# Patient Record
Sex: FEMALE | Race: WHITE | Employment: FULL TIME | URBAN - METROPOLITAN AREA
[De-identification: names, ages, dates, MRNs, and addresses within clinical notes are randomized per-mention and may not be internally consistent; named-entity substitution may affect disease eponyms.]

---

## 2017-10-02 RX ORDER — FOLIC ACID 1 MG/1
TABLET ORAL
Qty: 30 TABLET | Refills: 11 | Status: SHIPPED | OUTPATIENT
Start: 2017-10-02 | End: 2018-01-02

## 2017-10-02 NOTE — TELEPHONE ENCOUNTER
Pending Prescriptions Disp Refills    FOLIC ACID 1 MG Oral Tab [Pharmacy Med Name: FOLIC ACID 1MG TABLETS] 30 tablet 0     Sig: TAKE 1 TABLET(1 MG) BY MOUTH DAILY         See refill request  Patient last seen 11-18-16.    Medication last refilled 10-17-12

## 2017-12-11 ENCOUNTER — TELEPHONE (OUTPATIENT)
Dept: DERMATOLOGY CLINIC | Facility: CLINIC | Age: 42
End: 2017-12-11

## 2017-12-11 NOTE — TELEPHONE ENCOUNTER
Having a horrible break out, she is red and irritated all over the face and would not like to wait till 2nd week in January and I offered her appts in Dec. In [place] but did not work with her schedule she is not sure what to do until January to get relief

## 2017-12-11 NOTE — TELEPHONE ENCOUNTER
LOV 1/27/16 - pt with hx of acne - states she is having a bad break out to chin area only - states chin is red and painful. She has resumed her doxycycline beginning of November but stopped after 2 weeks and started OTC differin.  She resumed doxycycline sh

## 2017-12-12 ENCOUNTER — TELEPHONE (OUTPATIENT)
Dept: GASTROENTEROLOGY | Facility: CLINIC | Age: 42
End: 2017-12-12

## 2017-12-12 NOTE — TELEPHONE ENCOUNTER
Pt unable to come to tomorrow's appt per RN's request.  Requesting to see CB on Monday. 12/18/2017. Pls call. Thank you.

## 2017-12-12 NOTE — TELEPHONE ENCOUNTER
I offered the pt an appt on 12/19/17 [facility] BIPIN @ 1:30 . She will call me back tomorrow. I put a hold on the appt.     Phone Room it's ok to schedule if pt calls back

## 2017-12-12 NOTE — TELEPHONE ENCOUNTER
Problem and Assessment:    Pt spoken with at time of original call. Pt c/o pain, dry, cracking, bleeding skin to chin. 7/10 pain. Notes that it is painful to even open her mouth to chew food.   Pt denies edema of the face, denies respiratory distress and

## 2017-12-14 NOTE — TELEPHONE ENCOUNTER
Future Appointments  Date Time Provider Joaquin Valladares   12/20/2017 2:00 PM Omar Huff MD [facility] Artemio HARLEY   12/20/2017 5:30 PM MD INEZ Patel     Pt took appt for 12/20/17 at 2 pm at [facility]

## 2017-12-20 ENCOUNTER — OFFICE VISIT (OUTPATIENT)
Dept: GASTROENTEROLOGY | Facility: CLINIC | Age: 42
End: 2017-12-20

## 2017-12-20 ENCOUNTER — OFFICE VISIT (OUTPATIENT)
Dept: DERMATOLOGY CLINIC | Facility: CLINIC | Age: 42
End: 2017-12-20

## 2017-12-20 VITALS
HEIGHT: 63.5 IN | WEIGHT: 134 LBS | SYSTOLIC BLOOD PRESSURE: 118 MMHG | BODY MASS INDEX: 23.45 KG/M2 | DIASTOLIC BLOOD PRESSURE: 76 MMHG | HEART RATE: 101 BPM

## 2017-12-20 DIAGNOSIS — L73.9 ACNE WITH GRAM NEGATIVE FOLLICULITIS: Primary | ICD-10-CM

## 2017-12-20 DIAGNOSIS — B96.89 ACNE WITH GRAM NEGATIVE FOLLICULITIS: Primary | ICD-10-CM

## 2017-12-20 DIAGNOSIS — L70.0 ACNE VULGARIS: ICD-10-CM

## 2017-12-20 DIAGNOSIS — K51.00 ULCERATIVE PANCOLITIS WITHOUT COMPLICATION (HCC): Primary | ICD-10-CM

## 2017-12-20 DIAGNOSIS — L70.9 ACNE WITH GRAM NEGATIVE FOLLICULITIS: Primary | ICD-10-CM

## 2017-12-20 PROCEDURE — 99214 OFFICE O/P EST MOD 30 MIN: CPT | Performed by: INTERNAL MEDICINE

## 2017-12-20 PROCEDURE — 99213 OFFICE O/P EST LOW 20 MIN: CPT | Performed by: DERMATOLOGY

## 2017-12-20 PROCEDURE — 99212 OFFICE O/P EST SF 10 MIN: CPT | Performed by: INTERNAL MEDICINE

## 2017-12-20 PROCEDURE — 99212 OFFICE O/P EST SF 10 MIN: CPT | Performed by: DERMATOLOGY

## 2017-12-20 RX ORDER — SULFAMETHOXAZOLE AND TRIMETHOPRIM 400; 80 MG/1; MG/1
1 TABLET ORAL 2 TIMES DAILY
Qty: 30 TABLET | Refills: 1 | Status: SHIPPED | OUTPATIENT
Start: 2017-12-20 | End: 2018-01-17

## 2017-12-20 RX ORDER — MESALAMINE 1.2 G/1
4.8 TABLET, DELAYED RELEASE ORAL DAILY
Qty: 120 TABLET | Refills: 11 | Status: SHIPPED | OUTPATIENT
Start: 2017-12-20 | End: 2019-05-31 | Stop reason: ALTCHOICE

## 2017-12-20 NOTE — PROGRESS NOTES
HPI:    Patient ID: Hao Reyes returns today for follow-up. As last year, she is doing great. Continues in symptomatic remission. Ramos Bhandari continues to pass 1–2 bowel movements per day. No blood. No flares in the past year.     Continues on the s BIRTH:                                        08/19/1975 30Years old  DATE:                                                            10/17/2012 2:34 PM  MEDICAL RECORD KXBHYV:               PMZ:[id number]  [address]    VISIT XWHJ:                    History  Condition                                                         Year  Procedure/Surgery                                        Year  endometriosis                                                                                                    %              45        apply by topical route  every day to the affected area(s)at bedtime  07/25/2012 Aldactone                     25 Mg               30        take 1 tablet (25MG)  by oral route  every day  03/20/2012 Zafar                            Pancolitis on colonoscopy examination of October 2008, which has not yet been repeated. Overall, she has had good control with the mesalamine medications, changed to sulfasalazine medication July 2011 as above.  She has responded well to brief bursts of pre been repeated. Overall, she has had good control with the mesalamine medications, changed to sulfasalazine medication July 2011 as above. She has responded well to brief bursts of prednisone with her flares.     Returns for follow-up 12/20/2017 and symptoma

## 2017-12-21 ENCOUNTER — TELEPHONE (OUTPATIENT)
Dept: GASTROENTEROLOGY | Facility: CLINIC | Age: 42
End: 2017-12-21

## 2017-12-21 DIAGNOSIS — K51.919 ULCERATIVE COLITIS WITH COMPLICATION, UNSPECIFIED LOCATION (HCC): Primary | ICD-10-CM

## 2017-12-21 NOTE — TELEPHONE ENCOUNTER
Scheduled for:  Colonoscopy - [id number]  Provider Name:  Dr. John Jose  Date:  12/23/17  Location:  [facility]  Sedation:  IV  Time:  8:15 am (pt is aware to arrive at 7:15 am)  Prep:  Miralax/Gatorade, Prep instructions were given to pt over the phone, pt verbalized un

## 2017-12-21 NOTE — TELEPHONE ENCOUNTER
Pt states that Dr. Jessica Ortiz told her to call this office today to schedule colonoscopy for Sat 12/23/17. Please call.

## 2017-12-21 NOTE — TELEPHONE ENCOUNTER
Thanks Debbie!!    Schedule colonoscopy exam at [address] [place] [address] (Sat 12/23)    IV sedation (conscious sedation)    Miralax split dose bowel prep      DX = Ulcerative colitis

## 2017-12-21 NOTE — TELEPHONE ENCOUNTER
NINA RN's - please send Miralax & Gatorade prep instructions to pt via [employer]. Pt has CLN scheduled for this Saturday, 12/23/17 per Dr. Yari Patel. Thank you.

## 2017-12-21 NOTE — TELEPHONE ENCOUNTER
Dr Dirk Elmore - I see your notes below but do not see any orders on prep. Please advise        \"3. I recommended colonoscopy examination; should be undergoing colonoscopy exams every 3–5 years.   They have a high deductible insurance policy and just hit their

## 2017-12-22 NOTE — PROGRESS NOTES
Results not released. Not all final at this time, negative to date, please let pt know.   I do not know if she really would see improvement yet, may take more like 10-14 days

## 2017-12-23 ENCOUNTER — HOSPITAL ENCOUNTER (OUTPATIENT)
Facility: HOSPITAL | Age: 42
Setting detail: HOSPITAL OUTPATIENT SURGERY
Discharge: HOME | End: 2017-12-23
Attending: INTERNAL MEDICINE | Admitting: INTERNAL MEDICINE
Payer: COMMERCIAL

## 2017-12-23 ENCOUNTER — SURGERY (OUTPATIENT)
Age: 42
End: 2017-12-23

## 2017-12-23 VITALS
DIASTOLIC BLOOD PRESSURE: 76 MMHG | OXYGEN SATURATION: 99 % | HEART RATE: 98 BPM | WEIGHT: 134 LBS | RESPIRATION RATE: 25 BRPM | HEIGHT: 63 IN | SYSTOLIC BLOOD PRESSURE: 118 MMHG | BODY MASS INDEX: 23.74 KG/M2

## 2017-12-23 DIAGNOSIS — K51.919 ULCERATIVE COLITIS WITH COMPLICATION, UNSPECIFIED LOCATION (HCC): ICD-10-CM

## 2017-12-23 PROCEDURE — 0DBL8ZX EXCISION OF TRANSVERSE COLON, VIA NATURAL OR ARTIFICIAL OPENING ENDOSCOPIC, DIAGNOSTIC: ICD-10-PCS | Performed by: INTERNAL MEDICINE

## 2017-12-23 PROCEDURE — 0DBN8ZX EXCISION OF SIGMOID COLON, VIA NATURAL OR ARTIFICIAL OPENING ENDOSCOPIC, DIAGNOSTIC: ICD-10-PCS | Performed by: INTERNAL MEDICINE

## 2017-12-23 PROCEDURE — 0DBM8ZX EXCISION OF DESCENDING COLON, VIA NATURAL OR ARTIFICIAL OPENING ENDOSCOPIC, DIAGNOSTIC: ICD-10-PCS | Performed by: INTERNAL MEDICINE

## 2017-12-23 PROCEDURE — 45380 COLONOSCOPY AND BIOPSY: CPT | Performed by: INTERNAL MEDICINE

## 2017-12-23 PROCEDURE — 45385 COLONOSCOPY W/LESION REMOVAL: CPT | Performed by: INTERNAL MEDICINE

## 2017-12-23 PROCEDURE — 0DBK8ZX EXCISION OF ASCENDING COLON, VIA NATURAL OR ARTIFICIAL OPENING ENDOSCOPIC, DIAGNOSTIC: ICD-10-PCS | Performed by: INTERNAL MEDICINE

## 2017-12-23 RX ORDER — SODIUM CHLORIDE 0.9 % (FLUSH) 0.9 %
10 SYRINGE (ML) INJECTION AS NEEDED
Status: DISCONTINUED | OUTPATIENT
Start: 2017-12-23 | End: 2017-12-23

## 2017-12-23 RX ORDER — MIDAZOLAM HYDROCHLORIDE 1 MG/ML
INJECTION INTRAMUSCULAR; INTRAVENOUS
Status: DISCONTINUED | OUTPATIENT
Start: 2017-12-23 | End: 2017-12-23

## 2017-12-23 RX ORDER — MIDAZOLAM HYDROCHLORIDE 1 MG/ML
1 INJECTION INTRAMUSCULAR; INTRAVENOUS EVERY 5 MIN PRN
Status: DISCONTINUED | OUTPATIENT
Start: 2017-12-23 | End: 2017-12-23

## 2017-12-23 RX ORDER — SODIUM CHLORIDE, SODIUM LACTATE, POTASSIUM CHLORIDE, CALCIUM CHLORIDE 600; 310; 30; 20 MG/100ML; MG/100ML; MG/100ML; MG/100ML
INJECTION, SOLUTION INTRAVENOUS CONTINUOUS
Status: DISCONTINUED | OUTPATIENT
Start: 2017-12-23 | End: 2017-12-23

## 2017-12-23 NOTE — OPERATIVE REPORT
COLONOSCOPY PROCEDURE REPORT     DATE OF PROCEDURE:  12/23/2017     PCP: No primary care provider on file. PREOPERATIVE DIAGNOSIS: Ulcerative colitis with rectal bleeding     POSTOPERATIVE DIAGNOSIS:  See impression. SURGEON:  Dashawn Chris ileocecal valve with somewhat granular appearing mucosa on the valve itself, mucosal congestion and erythema, mucoid exudates. Intubation of the terminal ileum showed entirely normal ileal mucosa within this somewhat inflamed valve.   · Random colonic muco

## 2017-12-23 NOTE — H&P
History & Physical Examination    Patient Name: Carlota Spaulding  MRN: [id number]  [facility]: [id number]  YOB: 1975    Diagnosis: Ulcerative colitis    Present Illness: History of ulcerative colitis greater than 10 years, with rectal bleeding      Pr Relation Age of Onset   • Anemia Mother    • Other [OTHER] Father      Hearing loss   • Other [OTHER] Maternal Grandfather      heart attack        Smoking status: Former Smoker  0.25 Packs/day     Smokeless tobacco: Never Used    Alcohol use Yes  0.0 oz/w

## 2017-12-27 ENCOUNTER — TELEPHONE (OUTPATIENT)
Dept: GASTROENTEROLOGY | Facility: CLINIC | Age: 42
End: 2017-12-27

## 2017-12-27 NOTE — TELEPHONE ENCOUNTER
Chills and body aches, headache - since this afternoon - pt had CLN on 12/23- asking what meds she can take now for sx

## 2017-12-27 NOTE — TELEPHONE ENCOUNTER
I spoke to the pt. She is not running a fever. She called us because she was told  to stay away from ASA products up to 10 days after the procedure(colonoscopy 12/23/17). I advised she can take Tylenol which should cover most of their symptoms.

## 2017-12-28 NOTE — TELEPHONE ENCOUNTER
Thanks Anitra Markham! Agree with advice given. We cut out 2 large colon polyps and took a lot of biopsies so hopefully she can avoid anything stronger than Tylenol.

## 2017-12-30 ENCOUNTER — NURSE TRIAGE (OUTPATIENT)
Dept: OTHER | Age: 42
End: 2017-12-30

## 2017-12-30 NOTE — TELEPHONE ENCOUNTER
for  is it ok for pt to wait until Tuesday? . Pt stated that she has found some lumps on her neck and behind her ear. She stated that on Thursday she felt 1 of them and yesterday night she found 2 more.  One of the lumps is  behind her neck

## 2018-01-01 NOTE — PROGRESS NOTES
Sadia Bernardo is a 38year old female. Patient presents with:  Derm Problem: rash around mouth area ,electrical pain ,denies itch ,or bleed x 6 weeks got much worse when differin gel applied . Patient has no known allergies.     Current Outpa 2 (two) times daily. Disp: 30 tablet Rfl: 1   mupirocin 2 % External Ointment Apply 1 Application topically 3 (three) times daily.  Disp: 15 g Rfl: 3   FOLIC ACID 1 MG Oral Tab TAKE 1 TABLET(1 MG) BY MOUTH DAILY Disp: 30 tablet Rfl: 11   Doxycycline Hyclate ,denies itch ,or bleed x 6 weeks got much worse when differin gel applied . Has used various over-the-counter products without much change. Consider with increasing cystic nodules. In particular periorally.   Had been on doxycycline daily not taking twic months. Given history of ulcerative colitis would consider Accutane cautiously. Patient concerned regarding side effects and her UC. Luisora Dunker Acne inflammatory, nodular grade 3 with prominent postinflammatory changes. See medications in grid.   Skin care regime

## 2018-01-02 ENCOUNTER — OFFICE VISIT (OUTPATIENT)
Dept: FAMILY MEDICINE CLINIC | Facility: CLINIC | Age: 43
End: 2018-01-02

## 2018-01-02 ENCOUNTER — LAB ENCOUNTER (OUTPATIENT)
Dept: LAB | Age: 43
End: 2018-01-02
Attending: FAMILY MEDICINE
Payer: COMMERCIAL

## 2018-01-02 VITALS
HEIGHT: 63 IN | SYSTOLIC BLOOD PRESSURE: 102 MMHG | WEIGHT: 134 LBS | TEMPERATURE: 99 F | DIASTOLIC BLOOD PRESSURE: 67 MMHG | HEART RATE: 94 BPM | BODY MASS INDEX: 23.74 KG/M2

## 2018-01-02 DIAGNOSIS — R59.0 POSTAURICULAR ADENOPATHY: ICD-10-CM

## 2018-01-02 DIAGNOSIS — R59.0 CERVICAL ADENOPATHY: ICD-10-CM

## 2018-01-02 DIAGNOSIS — R22.1 NECK MASS: Primary | ICD-10-CM

## 2018-01-02 DIAGNOSIS — N76.0 ACUTE VAGINITIS: ICD-10-CM

## 2018-01-02 LAB
BASOPHILS # BLD: 0 K/UL (ref 0–0.2)
BASOPHILS NFR BLD: 0 %
EOSINOPHIL # BLD: 0 K/UL (ref 0–0.7)
EOSINOPHIL NFR BLD: 1 %
ERYTHROCYTE [DISTWIDTH] IN BLOOD BY AUTOMATED COUNT: 12.7 % (ref 11–15)
HCT VFR BLD AUTO: 37.9 % (ref 35–48)
HGB BLD-MCNC: 12.4 G/DL (ref 12–16)
LYMPHOCYTES # BLD: 1.5 K/UL (ref 1–4)
LYMPHOCYTES NFR BLD: 32 %
MCH RBC QN AUTO: 30 PG (ref 27–32)
MCHC RBC AUTO-ENTMCNC: 32.8 G/DL (ref 32–37)
MCV RBC AUTO: 91.5 FL (ref 80–100)
MONOCYTES # BLD: 0.4 K/UL (ref 0–1)
MONOCYTES NFR BLD: 8 %
NEUTROPHILS # BLD AUTO: 2.7 K/UL (ref 1.8–7.7)
NEUTROPHILS NFR BLD: 59 %
PLATELET # BLD AUTO: 228 K/UL (ref 140–400)
PMV BLD AUTO: 8.7 FL (ref 7.4–10.3)
RBC # BLD AUTO: 4.14 M/UL (ref 3.7–5.4)
WBC # BLD AUTO: 4.6 K/UL (ref 4–11)

## 2018-01-02 PROCEDURE — 86308 HETEROPHILE ANTIBODY SCREEN: CPT

## 2018-01-02 PROCEDURE — 99214 OFFICE O/P EST MOD 30 MIN: CPT | Performed by: FAMILY MEDICINE

## 2018-01-02 PROCEDURE — 85025 COMPLETE CBC W/AUTO DIFF WBC: CPT

## 2018-01-02 PROCEDURE — 36415 COLL VENOUS BLD VENIPUNCTURE: CPT

## 2018-01-02 PROCEDURE — 99212 OFFICE O/P EST SF 10 MIN: CPT | Performed by: FAMILY MEDICINE

## 2018-01-02 RX ORDER — FLUCONAZOLE 150 MG/1
150 TABLET ORAL ONCE
Qty: 1 TABLET | Refills: 0 | Status: SHIPPED | OUTPATIENT
Start: 2018-01-02 | End: 2018-01-02

## 2018-01-02 NOTE — PROGRESS NOTES
HPI:    Patient ID: Ricky Davis is a 38year old female. HPI     PATIENT here with complains of noticing lumps in her neck. She states she had a colonoscopy in December 23 and then a few days later felt very achy and tired.   She had low-grade fever Right Ear: External ear normal.   Left Ear: External ear normal.   Nose: Nose normal.   Mouth/Throat: Oropharynx is clear and moist. No oropharyngeal exudate. Neck: Normal range of motion. Neck supple. No thyromegaly present.        LARGE LYMPH NODE LEFT

## 2018-01-03 ENCOUNTER — TELEPHONE (OUTPATIENT)
Dept: FAMILY MEDICINE CLINIC | Facility: CLINIC | Age: 43
End: 2018-01-03

## 2018-01-03 LAB — HETEROPH AB SER QL: NEGATIVE

## 2018-01-03 NOTE — TELEPHONE ENCOUNTER
Spoke with patient and relayed lab results below--patient verbalizes understanding--would like AMA advice as what next steps will be for lumps on neck.     Please advise    Please reply to tony: EM RN Minna Severin   Order: [id number]   Miriam Iniguez

## 2018-01-03 NOTE — TELEPHONE ENCOUNTER
Labs normal  Would take kef;ex 500mg three times a day x 7 days  Follow up 2 weeks if symptoms persist

## 2018-01-04 NOTE — TELEPHONE ENCOUNTER
Pt was inform of Alysa Calhoun message below and will hold bactrim and take Keflex for 7 days and f/u in 2 weeks.

## 2018-01-04 NOTE — TELEPHONE ENCOUNTER
Pt already on an antibiotic should she take this in addition to the other one?  See [facility]

## 2018-01-10 ENCOUNTER — TELEPHONE (OUTPATIENT)
Dept: OTHER | Age: 43
End: 2018-01-10

## 2018-01-10 NOTE — TELEPHONE ENCOUNTER
Pt stts since starting on Cephalexin yesterday, pt has developed all over abdominal discomfort,gassiness and gurgling. Denies diarrhea or vomiting. I advised pt not to take anymore until she hears back from us.  Pt stts lumps on neck are much smaller in siz

## 2018-01-10 NOTE — TELEPHONE ENCOUNTER
Recommend atking medication with food.   Also take probiotic with it that helps  If still no better, stop abx

## 2018-01-11 ENCOUNTER — TELEPHONE (OUTPATIENT)
Dept: GASTROENTEROLOGY | Facility: CLINIC | Age: 43
End: 2018-01-11

## 2018-01-11 NOTE — TELEPHONE ENCOUNTER
Pt contacted. Relayed Dr. Bautista Flow message. Pt states she's been taking antibiotics with food. Pt states she has a hx of UC, and has been taking probiotics.  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

## 2018-01-11 NOTE — TELEPHONE ENCOUNTER
Pt requesting to speak with RN about rx:antibotics possibly causing flare up, pls call at:[phone],thanks.

## 2018-01-12 NOTE — TELEPHONE ENCOUNTER
Dr Benjamin Fiore:    Pt states since having her Colonoscopy procedure 12/13/17 her body has gone \"haywire\" due to being on antibiotics. She was on antibiotics from her dermatologist,  and again from Dr Hector Manjarrez.  She prescribed Keflex 500 mg three times a day for 7

## 2018-01-12 NOTE — TELEPHONE ENCOUNTER
GI RNs–  Please advise Caribe that I agree with her. This probably is the antibiotics and the effect of the colonoscopy/bowel prep.   Her Colonoscopy actually looked very healthy on 12/23/2017 -- the ulcerative colitis has been under very good control by t

## 2018-01-17 ENCOUNTER — OFFICE VISIT (OUTPATIENT)
Dept: FAMILY MEDICINE CLINIC | Facility: CLINIC | Age: 43
End: 2018-01-17

## 2018-01-17 ENCOUNTER — TELEPHONE (OUTPATIENT)
Dept: OTHER | Age: 43
End: 2018-01-17

## 2018-01-17 ENCOUNTER — TELEPHONE (OUTPATIENT)
Dept: GASTROENTEROLOGY | Facility: CLINIC | Age: 43
End: 2018-01-17

## 2018-01-17 VITALS
HEART RATE: 99 BPM | WEIGHT: 134 LBS | SYSTOLIC BLOOD PRESSURE: 94 MMHG | HEIGHT: 63 IN | BODY MASS INDEX: 23.74 KG/M2 | TEMPERATURE: 99 F | DIASTOLIC BLOOD PRESSURE: 62 MMHG

## 2018-01-17 DIAGNOSIS — R21 RASH: Primary | ICD-10-CM

## 2018-01-17 PROCEDURE — 99212 OFFICE O/P EST SF 10 MIN: CPT | Performed by: FAMILY MEDICINE

## 2018-01-17 PROCEDURE — 99214 OFFICE O/P EST MOD 30 MIN: CPT | Performed by: FAMILY MEDICINE

## 2018-01-17 PROCEDURE — 97810 ACUP 1/> WO ESTIM 1ST 15 MIN: CPT | Performed by: FAMILY MEDICINE

## 2018-01-17 RX ORDER — METHYLPREDNISOLONE 4 MG/1
TABLET ORAL
Qty: 1 KIT | Refills: 0 | Status: SHIPPED | OUTPATIENT
Start: 2018-01-17 | End: 2021-05-18 | Stop reason: ALTCHOICE

## 2018-01-17 RX ORDER — DIPHENHYDRAMINE HCL 25 MG
25 TABLET ORAL EVERY 6 HOURS PRN
COMMUNITY
End: 2021-05-18 | Stop reason: ALTCHOICE

## 2018-01-17 NOTE — PROCEDURES
Li11, liv3, kid 27, local points surrounding eyes,   Ear points.    No complications  All needles removed

## 2018-01-17 NOTE — PROGRESS NOTES
HPI:    Patient ID: Derrick Byrd is a 38year old female. Yesterday am took bactrim and started in the evening with redness of the face, itchy ears, swollen eyes. No tongue swelling         Review of Systems   Constitutional: Negative.   Negative for a

## 2018-01-17 NOTE — TELEPHONE ENCOUNTER
Message   Received: Yesterday   Message Contents   Jad Dickerson MD  [facility] Clinical Staff             GI RNs - 1.  Please print and mail this letter to patient; 2. Recall for colonoscopy exam in 3 years

## 2018-01-17 NOTE — TELEPHONE ENCOUNTER
Appointment scheduled with Dr. Joellen Pinedo today 1/17/18 at 11:15am at [facility]. [facility] Patient was on Bactrim since December 20 and was off it for a few weeks and took it again yesterday.  Patient states yesterday her face an

## 2018-01-19 ENCOUNTER — TELEPHONE (OUTPATIENT)
Dept: GASTROENTEROLOGY | Facility: CLINIC | Age: 43
End: 2018-01-19

## 2018-01-19 NOTE — TELEPHONE ENCOUNTER
Pt states Rx mesalamine 1.2 g Oral Tab EC is too expensive and would like to know if the office has coupons or saving cards. Pt also would like to know if there is an alternative medication she can take.  Please call thank you [phone]        Current Ou

## 2018-01-21 NOTE — TELEPHONE ENCOUNTER
GI RNs–  Looks like the cash price for 1 month of Lialda would be about $1100. So this would definitely cost them all $6000 of that deductible per year. The other/identical option medication Apriso looks like it runs around $550/month.   This is much mu

## 2018-01-22 RX ORDER — FOLIC ACID 1 MG/1
1 TABLET ORAL DAILY
Qty: 30 TABLET | Refills: 11 | Status: SHIPPED | OUTPATIENT
Start: 2018-01-22 | End: 2018-02-21

## 2018-01-22 RX ORDER — SULFASALAZINE 500 MG/1
1000 TABLET ORAL 3 TIMES DAILY
Qty: 180 TABLET | Refills: 11 | Status: SHIPPED | OUTPATIENT
Start: 2018-01-22 | End: 2018-02-21

## 2018-01-22 NOTE — TELEPHONE ENCOUNTER
Got it. Rx sent to her [facility] for the sulfasalazine and folic acid. Those two go together.     [facility] [address] [place] [address], [phone], [phone] [

## 2018-12-03 ENCOUNTER — TELEPHONE (OUTPATIENT)
Dept: GASTROENTEROLOGY | Facility: CLINIC | Age: 43
End: 2018-12-03

## 2018-12-03 DIAGNOSIS — R10.13 EPIGASTRIC ABDOMINAL PAIN: Primary | ICD-10-CM

## 2018-12-03 NOTE — TELEPHONE ENCOUNTER
Pt calling back - asking why she has not received cb yet - states she has pain - I told her office was closed for the day and they will cb tomorrow re kelley Ortiz -

## 2018-12-03 NOTE — TELEPHONE ENCOUNTER
Pt called to speak to RN about medication. No other details given.   Please call [phone] before 5pm or [phone] after 5pm.

## 2018-12-04 RX ORDER — BUDESONIDE 3 MG/1
9 CAPSULE, COATED PELLETS ORAL EVERY MORNING
Qty: 90 CAPSULE | Refills: 1 | Status: SHIPPED | OUTPATIENT
Start: 2018-12-04 | End: 2018-12-04

## 2018-12-04 NOTE — TELEPHONE ENCOUNTER
Pt with a hx of UC    Thinks she is having a flare but pain may also be related to NSAID's    Last Friday she started to experience pain in her stomach. Pain is shooting underneath her rib cage.      She has tried taking pepto bismol but it did not help

## 2018-12-05 NOTE — TELEPHONE ENCOUNTER
I called Steveotilia's mobile number and reached her voicemail. I left a message advising that this could be either NSAID gastritis/enteritis if the pain is upper abdomen or a UC flare.   If this is more of a gastritis problem, this will get better fairly Samuel Egan Abdominal ultrasound rule out cholelithiasis biliary colic  5. Budesonide is not an affordable option. If we believe this is an ulcerative colitis flare, may need to start low-dose (20 mg) or full dose prednisone burst and taper.

## 2019-01-31 ENCOUNTER — TELEPHONE (OUTPATIENT)
Dept: GASTROENTEROLOGY | Facility: CLINIC | Age: 44
End: 2019-01-31

## 2019-05-13 ENCOUNTER — PATIENT MESSAGE (OUTPATIENT)
Dept: GASTROENTEROLOGY | Facility: CLINIC | Age: 44
End: 2019-05-13

## 2019-05-15 NOTE — TELEPHONE ENCOUNTER
From: Larina Spurling  To:  Henrry Neal MD  Sent: 5/13/2019 7:19 PM CDT  Subject: Other    I will take the 830a appt

## 2019-05-31 ENCOUNTER — OFFICE VISIT (OUTPATIENT)
Dept: GASTROENTEROLOGY | Facility: CLINIC | Age: 44
End: 2019-05-31
Payer: COMMERCIAL

## 2019-05-31 VITALS
HEART RATE: 90 BPM | BODY MASS INDEX: 23.04 KG/M2 | HEIGHT: 63 IN | SYSTOLIC BLOOD PRESSURE: 111 MMHG | WEIGHT: 130 LBS | DIASTOLIC BLOOD PRESSURE: 74 MMHG

## 2019-05-31 DIAGNOSIS — K51.00 ULCERATIVE PANCOLITIS WITHOUT COMPLICATION (HCC): Primary | ICD-10-CM

## 2019-05-31 PROCEDURE — 99212 OFFICE O/P EST SF 10 MIN: CPT | Performed by: INTERNAL MEDICINE

## 2019-05-31 PROCEDURE — 99213 OFFICE O/P EST LOW 20 MIN: CPT | Performed by: INTERNAL MEDICINE

## 2019-05-31 RX ORDER — SULFASALAZINE 500 MG/1
500 TABLET ORAL 2 TIMES DAILY
Refills: 7 | COMMUNITY
Start: 2018-12-30 | End: 2019-05-31

## 2019-05-31 RX ORDER — FOLIC ACID 1 MG/1
1 TABLET ORAL DAILY
Refills: 2 | COMMUNITY
Start: 2018-12-30 | End: 2019-05-31

## 2019-05-31 RX ORDER — SULFASALAZINE 500 MG/1
1000 TABLET ORAL 2 TIMES DAILY
Qty: 360 TABLET | Refills: 3 | Status: SHIPPED | OUTPATIENT
Start: 2019-05-31 | End: 2019-08-29

## 2019-05-31 RX ORDER — IBUPROFEN AND PSEUDOEPHEDRINE HYDROCHLORIDE 200; 30 MG/1; MG/1
200 TABLET, COATED ORAL ONCE AS NEEDED
Refills: 3 | COMMUNITY
Start: 2019-05-10 | End: 2021-05-18 | Stop reason: ALTCHOICE

## 2019-05-31 RX ORDER — FOLIC ACID 1 MG/1
1 TABLET ORAL DAILY
Qty: 90 TABLET | Refills: 3 | Status: SHIPPED | OUTPATIENT
Start: 2019-05-31 | End: 2019-08-29

## 2019-05-31 NOTE — PROGRESS NOTES
HPI:    Patient ID: Estela Chaudhari returns today for follow-up. She is bright beautiful and smiling as usual.  She looks strong and healthy.     Last time, we discussed and arranged a colonoscopy examination to catch her deductible before the end of the ye sulfasalazine. She does not recall whether she heard anything on the Lialda prescription which I thought I sent over to her [facility] provider last year. She gets her prescriptions both at [facility] and from [address] [place] [address].   She does describe nausea, ch ________________________________________________________________________      Chief Complaint/Reason for visit:  This 30year old female presents with colitis follow up.     History of Present Illness  1.  Colitis follow up     Discuss perscription for  F Name                   Dose                Qty       Description  09/24/2012 Folic Acid                      1 Mg                 30        take 1 tablet (1MG)  by oral route  every day  07/25/2012 Atralin                           0.05 %              45   colitis formally diagnosed in 2008 as above, likely with symptoms going back approximately age 24.  Pancolitis on colonoscopy examination of October 2008, which has not yet been repeated.  Overall, she has had good control with the mesalamine medications, c showed no masses. The Olympus pediatric video colonoscope was placed in the patient's rectum and advanced under direct visualization through the entire length of the colon up to the cecum and terminal ileum.   Retroflexion performed up the ascending colon aspirin or NSAID medications for next 10 days to prevent bleeding. PATH:  A. Polyps, sigmoid colon:  · Hyperplastic polyps.      B. Ascending colon; biopsy:  · Mild acute colitis. · No evidence of granulomata, epithelial dysplasia or carcinoma.     C. found.    41year old woman with history of ulcerative colitis formally diagnosed in 2008 as above, likely with symptoms going back approximately age 24.  Pancolitis on colonoscopy examination of October 2008.  Overall, she has had good control with the mes doing very well on the sulfasalazine folic acid above.        4. Aggravating factors  Importance of avoidance of NSAID medications discussed. Takes Beyaz Oral contraceptive therapy.     5. Disease and drug monitoring.     · Initiate monitoring of  CRP, f

## 2020-02-04 ENCOUNTER — TELEPHONE (OUTPATIENT)
Dept: GASTROENTEROLOGY | Facility: CLINIC | Age: 45
End: 2020-02-04

## 2020-02-04 NOTE — TELEPHONE ENCOUNTER
Patient calling to speak with , patient would like to try a juice cleanse. Patient suffers from colitis and would like his opinion before starting cleanse. Please call at:[phone],thanks.

## 2020-02-05 NOTE — TELEPHONE ENCOUNTER
I spoke to the pt and she wants to do a 3 day cleanse. The drinks are premixed containing mostly fruits and vegetables along with sana, tumeric, lemon. She would be drinking 6/day    She is absolutely doing well otherwise. No colitis flare.  She is ta

## 2020-02-06 NOTE — TELEPHONE ENCOUNTER
Spoke to pt and relayed Dr. Alyssa Griffith message as shown below. Pt verbalized understanding of whole message and had no further questions at this time.

## 2020-02-06 NOTE — TELEPHONE ENCOUNTER
GI RNs–  Please call nic Eliceo and tell her that a juice/colon cleanse should be fine. Some of those things including the sana and turmeric are anti-inflammatory, very good for the gut.   I guess it could stir up a little diarrhea with her colitis, b

## 2020-04-22 ENCOUNTER — PATIENT MESSAGE (OUTPATIENT)
Dept: GASTROENTEROLOGY | Facility: CLINIC | Age: 45
End: 2020-04-22

## 2020-04-22 ENCOUNTER — TELEPHONE (OUTPATIENT)
Dept: GASTROENTEROLOGY | Facility: CLINIC | Age: 45
End: 2020-04-22

## 2020-04-22 RX ORDER — ESCITALOPRAM OXALATE 10 MG/1
10 TABLET ORAL DAILY
Qty: 30 TABLET | Refills: 1 | Status: SHIPPED | OUTPATIENT
Start: 2020-04-22 | End: 2021-05-18 | Stop reason: ALTCHOICE

## 2020-04-22 RX ORDER — CLONAZEPAM 0.5 MG/1
0.5 TABLET ORAL 2 TIMES DAILY PRN
Qty: 30 TABLET | Refills: 1 | Status: SHIPPED | OUTPATIENT
Start: 2020-04-22 | End: 2021-05-18 | Stop reason: ALTCHOICE

## 2020-04-22 NOTE — TELEPHONE ENCOUNTER
From: Nallely Reece  To: Zane Chavez MD  Sent: 4/22/2020 10:40 AM CDT  Subject: Other    Dr. Morteza Castellanos,   I would like to talk to you about something pertaining to this whole pandemic.  If you could please call me [phone] or we can do the on

## 2020-04-22 NOTE — TELEPHONE ENCOUNTER
Patient requesting Rx for anxiety due to the corona virus and having to return to work by May 4th. Patient does not have a PCP . Patient requesting to speak with you. Please advise. Thank you.

## 2020-04-22 NOTE — TELEPHONE ENCOUNTER
Patient requesting to speak with  regarding a non urgent matter, no further details, please call at:[phone],thanks.

## 2020-04-22 NOTE — TELEPHONE ENCOUNTER
I called Eliceo. 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 She was furloughed on 3/21/2020 and has been home practicing social isolation ever since. Her two kids are home from school.   Her mother, older than 61 with d anxiety, ideally for a few days to get her over the shock of going back out into the public. I advised that I will research short-term anxiety treatment options and call back.

## 2020-04-23 NOTE — TELEPHONE ENCOUNTER
I called Eliceo back and recommended starting a trial of Lexapro 10 mg once daily and clonazepam as needed. She would like to start immediately to see how she tolerates. Prescriptions sent into her [address].     Zoey Galvan does not currently have a

## 2020-10-26 ENCOUNTER — TELEPHONE (OUTPATIENT)
Dept: GASTROENTEROLOGY | Facility: CLINIC | Age: 45
End: 2020-10-26

## 2020-10-26 NOTE — TELEPHONE ENCOUNTER
----- Message from Peter Ponce RN sent at 1/17/2018 11:02 AM CST -----  Regarding: colon recall  Colon recall 3 years per Cb.  Done 12/23/17

## 2020-11-12 ENCOUNTER — APPOINTMENT (OUTPATIENT)
Dept: LAB | Age: 45
End: 2020-11-12
Attending: INTERNAL MEDICINE
Payer: COMMERCIAL

## 2020-11-12 DIAGNOSIS — Z20.822 PERSON UNDER INVESTIGATION FOR COVID-19: ICD-10-CM

## 2020-12-04 ENCOUNTER — TELEPHONE (OUTPATIENT)
Dept: GASTROENTEROLOGY | Facility: CLINIC | Age: 45
End: 2020-12-04

## 2020-12-04 NOTE — TELEPHONE ENCOUNTER
Bertram has questions regarding Sulfasalazine rx - has an alert that patient may be allergic to ingredient in medication. Please call. Thank you.

## 2020-12-07 ENCOUNTER — TELEPHONE (OUTPATIENT)
Dept: GASTROENTEROLOGY | Facility: CLINIC | Age: 45
End: 2020-12-07

## 2020-12-07 NOTE — TELEPHONE ENCOUNTER
[facility] Tressa has questions regarding Sulfazalazine. PLease call with ref# [id number]. Thank you.

## 2020-12-07 NOTE — TELEPHONE ENCOUNTER
As before, Nallely Kelsey continues on the sulfasalazine medication. Currently taking 1000 mg BID.   She continues on the folic acid as well.       I spoke to Elbert Sims. RIGOBERTO at [address] [place] [address] and clarified the prescription for the Sulfasalazine as above per Dr Parminder Kitchen

## 2020-12-07 NOTE — TELEPHONE ENCOUNTER
I spoke to the pt and she has been on Sulfasalazine for a long time. She is not allergic to sulfa. She will call the pharmacy to clarify this with them.  She is aware there is a 2 week prescription at [facility] and Dr John Jose also sent a prescription to [facility]

## 2021-05-18 ENCOUNTER — OFFICE VISIT (OUTPATIENT)
Dept: FAMILY MEDICINE CLINIC | Facility: CLINIC | Age: 46
End: 2021-05-18
Payer: COMMERCIAL

## 2021-05-18 VITALS
HEART RATE: 99 BPM | RESPIRATION RATE: 12 BRPM | SYSTOLIC BLOOD PRESSURE: 124 MMHG | DIASTOLIC BLOOD PRESSURE: 68 MMHG | TEMPERATURE: 99 F | WEIGHT: 140 LBS | BODY MASS INDEX: 24.8 KG/M2 | OXYGEN SATURATION: 98 % | HEIGHT: 63 IN

## 2021-05-18 DIAGNOSIS — H69.81 DYSFUNCTION OF RIGHT EUSTACHIAN TUBE: Primary | ICD-10-CM

## 2021-05-18 PROCEDURE — 3078F DIAST BP <80 MM HG: CPT | Performed by: NURSE PRACTITIONER

## 2021-05-18 PROCEDURE — 99212 OFFICE O/P EST SF 10 MIN: CPT | Performed by: NURSE PRACTITIONER

## 2021-05-18 PROCEDURE — 3008F BODY MASS INDEX DOCD: CPT | Performed by: NURSE PRACTITIONER

## 2021-05-18 PROCEDURE — 3074F SYST BP LT 130 MM HG: CPT | Performed by: NURSE PRACTITIONER

## 2021-05-18 RX ORDER — SULFASALAZINE 500 MG/1
TABLET ORAL
COMMUNITY
Start: 2021-03-10

## 2021-05-18 RX ORDER — FOLIC ACID 1 MG/1
TABLET ORAL
COMMUNITY
Start: 2021-03-10

## 2021-05-18 RX ORDER — FLUTICASONE PROPIONATE 50 MCG
2 SPRAY, SUSPENSION (ML) NASAL DAILY
Qty: 1 BOTTLE | Refills: 2 | Status: SHIPPED | OUTPATIENT
Start: 2021-05-18

## 2021-05-18 NOTE — PROGRESS NOTES
CHIEF COMPLAINT:   Patient presents with:  Ear Problem: Entered by patient      HPI:   Hannah Holder is a 39year old female who presents to clinic today with complaints of right ear problem. Has had for 1  days.  It is described as muffled hearing or \" EXAM:   /68 (BP Location: Right arm, Patient Position: Sitting, Cuff Size: adult)   Pulse 99   Temp 98.9 °F (37.2 °C) (Tympanic)   Resp 12   Ht 5' 3\" (1.6 m)   Wt 140 lb (63.5 kg)   LMP 05/04/2021 (Exact Date)   SpO2 98%   BMI 24.80 kg/m²   GENERAL: tube that connects the middle ear (the part of the ear behind the eardrum) to the back of the nose and throat       Normally, the eustachian tube helps keep the air pressure inside the middle ear the same as the air pressure outside the middle ear.  If ther eustachian tube problem by learning about your symptoms and doing an exam.    If your symptoms are severe or last for a long time, your doctor or nurse might:    ?Have you see a special kind of doctor called an ear, nose, and throat doctor  ? Do tests to ch

## 2021-05-18 NOTE — PATIENT INSTRUCTIONS
Eustachian tube problems  Written by the doctors and editors at [facility]     What is the eustachian tube? — The eustachian tube is a tube that connects the middle ear (the part of the ear behind the eardrum) to the back of the nose and throat       Camden Trujillo if they don’t go away after a few days. Will I need tests? — Probably not.  Your doctor or nurse should be able to tell if you have a eustachian tube problem by learning about your symptoms and doing an exam.    If your symptoms are severe or last for a

## 2021-09-17 ENCOUNTER — OFFICE VISIT (OUTPATIENT)
Dept: FAMILY MEDICINE CLINIC | Facility: CLINIC | Age: 46
End: 2021-09-17
Payer: COMMERCIAL

## 2021-09-17 VITALS
BODY MASS INDEX: 24.8 KG/M2 | WEIGHT: 140 LBS | RESPIRATION RATE: 15 BRPM | OXYGEN SATURATION: 97 % | SYSTOLIC BLOOD PRESSURE: 98 MMHG | HEIGHT: 63 IN | TEMPERATURE: 99 F | DIASTOLIC BLOOD PRESSURE: 70 MMHG | HEART RATE: 98 BPM

## 2021-09-17 DIAGNOSIS — J20.9 ACUTE BRONCHITIS, UNSPECIFIED ORGANISM: Primary | ICD-10-CM

## 2021-09-17 PROCEDURE — 99213 OFFICE O/P EST LOW 20 MIN: CPT | Performed by: PHYSICIAN ASSISTANT

## 2021-09-17 PROCEDURE — 3074F SYST BP LT 130 MM HG: CPT | Performed by: PHYSICIAN ASSISTANT

## 2021-09-17 PROCEDURE — 3078F DIAST BP <80 MM HG: CPT | Performed by: PHYSICIAN ASSISTANT

## 2021-09-17 PROCEDURE — 3008F BODY MASS INDEX DOCD: CPT | Performed by: PHYSICIAN ASSISTANT

## 2021-09-17 RX ORDER — BENZONATATE 200 MG/1
200 CAPSULE ORAL 3 TIMES DAILY PRN
Qty: 20 CAPSULE | Refills: 0 | Status: SHIPPED | OUTPATIENT
Start: 2021-09-17

## 2021-09-17 RX ORDER — METHYLPREDNISOLONE 4 MG/1
TABLET ORAL
Qty: 1 EACH | Refills: 0 | Status: SHIPPED | OUTPATIENT
Start: 2021-09-17

## 2021-09-17 NOTE — PROGRESS NOTES
CHIEF COMPLAINT:   Patient presents with:  Cough: Entered by patient    HPI:   Bell Valero is a 42year old female who presents for cough for 10 days.   Cough is productive of clear sputum mostly and is worse during the AM. Cough is not interfering with rarely    Drug use: No       REVIEW OF SYSTEMS:   GENERAL: See HPI, normal appetite  SKIN: No rashes, or other skin lesions. EYES: Denies change in vision. HENT: Denies ear pain or decreased hearing.   See HPI  CARDIOVASCULAR: Denies chest pain or palpit any acute worsening of symptoms. Requested Prescriptions     Signed Prescriptions Disp Refills   • methylPREDNISolone (MEDROL) 4 MG Oral Tablet Therapy Pack 1 each 0     Sig: As directed.    • benzonatate 200 MG Oral Cap 20 capsule 0     Sig: Take 1 cap ill with a viral infection or fever. It may cause severe liver or brain damage. · Your appetite may be poor, so a light diet is fine.  Avoid dehydration by drinking 6 to 8 glasses of fluids per day (such as water, soft drinks, sports drinks, juices, tea, o

## 2021-09-17 NOTE — PATIENT INSTRUCTIONS
Viral Bronchitis (Adult)    You have a viral bronchitis. Bronchitis is inflammation and swelling of the lining of the lungs. This is often caused by an infection. Symptoms include a dry, hacking cough that is worse at night.  The cough may bring up yellow and sore-throat medicines will not shorten the length of the illness, but they may help to reduce symptoms. Don't use decongestants if you have high blood pressure. Follow-up care  Follow up with your healthcare provider, or as advised.  If you had an [place]

## 2023-01-27 ENCOUNTER — TELEPHONE (OUTPATIENT)
Facility: CLINIC | Age: 48
End: 2023-01-27

## 2023-01-30 RX ORDER — SULFASALAZINE 500 MG/1
1000 TABLET ORAL 2 TIMES DAILY
Qty: 360 TABLET | Refills: 3 | Status: SHIPPED | OUTPATIENT
Start: 2023-01-30 | End: 2023-04-30

## 2023-01-30 NOTE — TELEPHONE ENCOUNTER
Last filled 03/21/2021    Last OV 05/31/2019    Your Appointments           Monday February 06, 2023  9:00 AM  Consult with Juan Kumar MD  [address], [address] [facility],[address], [facility]) [address] [place] [address],[address]  [phone]

## 2023-01-30 NOTE — TELEPHONE ENCOUNTER
Thanks Tiffany Gutiérrez! Khadar Suggs is close to my heart. Prescription for the sulfasalazine sent into [facility].

## 2023-02-01 ENCOUNTER — OFFICE VISIT (OUTPATIENT)
Dept: DERMATOLOGY CLINIC | Facility: CLINIC | Age: 48
End: 2023-02-01

## 2023-02-01 DIAGNOSIS — L70.0 ACNE VULGARIS: Primary | ICD-10-CM

## 2023-02-01 PROCEDURE — 99203 OFFICE O/P NEW LOW 30 MIN: CPT | Performed by: DERMATOLOGY

## 2023-02-01 RX ORDER — CLINDAMYCIN AND BENZOYL PEROXIDE 10; 50 MG/G; MG/G
1 GEL TOPICAL 2 TIMES DAILY
Qty: 150 G | Refills: 5 | Status: SHIPPED | OUTPATIENT
Start: 2023-02-01

## 2023-02-01 RX ORDER — LEVONORGESTREL 52 MG/1
1 INTRAUTERINE DEVICE INTRAUTERINE ONCE
COMMUNITY

## 2023-02-01 RX ORDER — CLINDAMYCIN AND BENZOYL PEROXIDE 10; 50 MG/G; MG/G
1 GEL TOPICAL 2 TIMES DAILY
Qty: 50 G | Refills: 5 | Status: SHIPPED | OUTPATIENT
Start: 2023-02-01 | End: 2023-02-01

## 2023-02-01 RX ORDER — DOXYCYCLINE 50 MG/1
TABLET ORAL
Qty: 60 TABLET | Refills: 3 | Status: SHIPPED | OUTPATIENT
Start: 2023-02-01

## 2023-02-01 RX ORDER — CLINDAMYCIN PHOSPHATE 10 MG/G
GEL TOPICAL
COMMUNITY
Start: 2022-12-02

## 2023-02-06 ENCOUNTER — OFFICE VISIT (OUTPATIENT)
Facility: CLINIC | Age: 48
End: 2023-02-06

## 2023-02-06 VITALS
DIASTOLIC BLOOD PRESSURE: 73 MMHG | HEIGHT: 63 IN | BODY MASS INDEX: 26.67 KG/M2 | WEIGHT: 150.5 LBS | HEART RATE: 91 BPM | SYSTOLIC BLOOD PRESSURE: 114 MMHG

## 2023-02-06 DIAGNOSIS — K51.00 ULCERATIVE PANCOLITIS WITHOUT COMPLICATION (HCC): Primary | ICD-10-CM

## 2023-02-06 PROCEDURE — 3008F BODY MASS INDEX DOCD: CPT | Performed by: INTERNAL MEDICINE

## 2023-02-06 PROCEDURE — 99203 OFFICE O/P NEW LOW 30 MIN: CPT | Performed by: INTERNAL MEDICINE

## 2023-02-06 PROCEDURE — 3078F DIAST BP <80 MM HG: CPT | Performed by: INTERNAL MEDICINE

## 2023-02-06 PROCEDURE — 3074F SYST BP LT 130 MM HG: CPT | Performed by: INTERNAL MEDICINE

## 2023-02-06 RX ORDER — FOLIC ACID 1 MG/1
1 TABLET ORAL DAILY
Qty: 90 TABLET | Refills: 3 | Status: SHIPPED | OUTPATIENT
Start: 2023-02-06 | End: 2023-05-07

## 2023-07-08 NOTE — TELEPHONE ENCOUNTER
Dr Brian Cottrell:    I spoke to 's insurance company. She has to cover a 6,000.00 deductible before any medication is covered. This happened last time and you had to prescribe Sulfasalazine. Please advise on how you would like to proceed.     thanks Negative

## 2023-08-23 RX ORDER — DOXYCYCLINE 50 MG/1
TABLET ORAL
Qty: 60 TABLET | Refills: 5 | Status: SHIPPED | OUTPATIENT
Start: 2023-08-23

## 2023-08-23 NOTE — TELEPHONE ENCOUNTER
Refill Request for medication(s):     Last Office Visit: 2/1/23    Last Refill: 2/1/23    Pharmacy, Dosage verified: yes    Condition Update (if applicable): requested    Rx pended and sent to provider for approval, please advise. Thank You!

## 2024-05-21 RX ORDER — SULFASALAZINE 500 MG/1
TABLET ORAL
Qty: 360 TABLET | Refills: 3 | Status: SHIPPED | OUTPATIENT
Start: 2024-05-21

## 2024-05-21 NOTE — TELEPHONE ENCOUNTER
Current Outpatient Medications   Medication Sig Dispense Refill    sulfaSALAzine 500 MG Oral Tab

## 2024-07-01 RX ORDER — DOXYCYCLINE 50 MG/1
TABLET ORAL
Qty: 60 TABLET | Refills: 0 | Status: SHIPPED | OUTPATIENT
Start: 2024-07-01

## 2024-07-01 NOTE — TELEPHONE ENCOUNTER
Refill Request for medication(s):     Last Office Visit: 2/1/2023    Last Refill: 8/23/23    Pharmacy, Dosage verified: yes    Condition Update (if applicable):     Rx pended and sent to provider for approval, please advise. Thank You!

## 2024-12-18 NOTE — TELEPHONE ENCOUNTER
Continued Stay Note  [facility]     Patient Name: Ely Sims  MRN: [id number]  Today's Date: 12/18/2024    Admit Date: 12/13/2024    Plan: Plan to return home with [facility] referral pending. Family to transport.   Discharge Plan       Row Name 12/18/24 1356       Plan    Plan Plan to return home with [facility] referral pending. Family to transport.    Patient/Family in Agreement with Plan yes    Plan Comments CCP followed up with pt and pt's daughter/Tiffany regarding DC plan. Tiffany is requesting a HH referral. Tiffany has no preference on HH agency.  CCP made referral to [facility]. CCP notified [facility] of referral. The plan will remain pt will return home with family to transport. [facility] referral pending. RLutes RN/CCP                   Discharge Codes    No documentation.                 Expected Discharge Date and Time       Expected Discharge Date Expected Discharge Time    Dec 20, 2024               Corinne Baez RN     I spoke to [place] over the phone. She household income is way to much for [facility].     She would like the sulfasalazine to be sent to her pharmacy

## (undated) DEVICE — Device: Brand: DEFENDO AIR/WATER/SUCTION AND BIOPSY VALVE

## (undated) DEVICE — SNARE 9MM 230CM 2.4MM EXACTO

## (undated) DEVICE — FORCEP RADIAL JAW 4

## (undated) DEVICE — TRAP 4 CPTR CHMBR N EZ INLN

## (undated) DEVICE — ENDOSCOPY PACK - LOWER: Brand: MEDLINE INDUSTRIES, INC.

## (undated) NOTE — LETTER
AUTHORIZATION FOR SURGICAL OPERATION OR OTHER PROCEDURE    1.  I hereby authorize Dr. Woo Noel, and [facility] to my case to perform the following operation and/or procedure at the [facility]:    __________ACUPUNCTURE______ Patient Name:  ______________________________________________________  (please print)      Patient signature:  ___________________________________________________             Relationship to Patient:             Parent    Responsible person

## (undated) NOTE — LETTER
10/26/2020    [address] [place], [place] [id number]            Dear Myra Allen,      Our records indicate that you are due for an appointment for a Colonoscopy with Brant Mota MD.    Please call our office to [email or web address]

## (undated) NOTE — LETTER
1/16/2018              [address] [place] [address]        [place] [id number]         Dear Patricia Borrero!!    I apologize for the delay in returning these results to you.     During your colonoscopy examination on 12/23/2017, I removed

## (undated) NOTE — LETTER
01/31/19        [address] [facility] [facility] [address]      Dear Santa Chan,    [facility] records indicate that you have outstanding lab work and or testing that was ordered for you and has not yet been completed:    [address] ---Please leny